# Patient Record
Sex: FEMALE | Race: BLACK OR AFRICAN AMERICAN | Employment: FULL TIME | URBAN - METROPOLITAN AREA
[De-identification: names, ages, dates, MRNs, and addresses within clinical notes are randomized per-mention and may not be internally consistent; named-entity substitution may affect disease eponyms.]

---

## 2019-12-21 ENCOUNTER — HOSPITAL ENCOUNTER (EMERGENCY)
Age: 29
Discharge: HOME | End: 2019-12-21
Attending: EMERGENCY MEDICINE
Payer: COMMERCIAL

## 2019-12-21 VITALS
OXYGEN SATURATION: 95 % | RESPIRATION RATE: 18 BRPM | DIASTOLIC BLOOD PRESSURE: 60 MMHG | HEIGHT: 64 IN | SYSTOLIC BLOOD PRESSURE: 124 MMHG | TEMPERATURE: 97.8 F | BODY MASS INDEX: 50.02 KG/M2 | WEIGHT: 293 LBS | HEART RATE: 76 BPM

## 2019-12-21 DIAGNOSIS — R53.81 MALAISE AND FATIGUE: ICD-10-CM

## 2019-12-21 DIAGNOSIS — R19.7 DIARRHEA, UNSPECIFIED TYPE: Primary | ICD-10-CM

## 2019-12-21 DIAGNOSIS — R53.83 MALAISE AND FATIGUE: ICD-10-CM

## 2019-12-21 LAB
ALBUMIN SERPL-MCNC: 3.7 G/DL (ref 3.5–5)
ALBUMIN/GLOB SERPL: 0.8 {RATIO} (ref 1.2–3.5)
ALP SERPL-CCNC: 80 U/L (ref 50–130)
ALT SERPL-CCNC: 24 U/L (ref 12–65)
ANION GAP SERPL CALC-SCNC: 6 MMOL/L (ref 7–16)
AST SERPL-CCNC: 17 U/L (ref 15–37)
BACTERIA URNS QL MICRO: 0 /HPF
BASOPHILS # BLD: 0 K/UL (ref 0–0.2)
BASOPHILS NFR BLD: 0 % (ref 0–2)
BILIRUB DIRECT SERPL-MCNC: 0.1 MG/DL
BILIRUB SERPL-MCNC: 0.2 MG/DL (ref 0.2–1.1)
BUN SERPL-MCNC: 12 MG/DL (ref 6–23)
CALCIUM SERPL-MCNC: 9.6 MG/DL (ref 8.3–10.4)
CASTS URNS QL MICRO: NORMAL /LPF
CHLORIDE SERPL-SCNC: 104 MMOL/L (ref 98–107)
CO2 SERPL-SCNC: 29 MMOL/L (ref 21–32)
CREAT SERPL-MCNC: 0.73 MG/DL (ref 0.6–1)
DIFFERENTIAL METHOD BLD: NORMAL
EOSINOPHIL # BLD: 0.2 K/UL (ref 0–0.8)
EOSINOPHIL NFR BLD: 2 % (ref 0.5–7.8)
EPI CELLS #/AREA URNS HPF: NORMAL /HPF
ERYTHROCYTE [DISTWIDTH] IN BLOOD BY AUTOMATED COUNT: 13.4 % (ref 11.9–14.6)
GLOBULIN SER CALC-MCNC: 4.4 G/DL (ref 2.3–3.5)
GLUCOSE SERPL-MCNC: 104 MG/DL (ref 65–100)
HCG UR QL: NEGATIVE
HCT VFR BLD AUTO: 39.4 % (ref 35.8–46.3)
HGB BLD-MCNC: 12.8 G/DL (ref 11.7–15.4)
IMM GRANULOCYTES # BLD AUTO: 0.1 K/UL (ref 0–0.5)
IMM GRANULOCYTES NFR BLD AUTO: 1 % (ref 0–5)
LYMPHOCYTES # BLD: 2.8 K/UL (ref 0.5–4.6)
LYMPHOCYTES NFR BLD: 34 % (ref 13–44)
MCH RBC QN AUTO: 27.6 PG (ref 26.1–32.9)
MCHC RBC AUTO-ENTMCNC: 32.5 G/DL (ref 31.4–35)
MCV RBC AUTO: 84.9 FL (ref 79.6–97.8)
MONOCYTES # BLD: 0.7 K/UL (ref 0.1–1.3)
MONOCYTES NFR BLD: 8 % (ref 4–12)
NEUTS SEG # BLD: 4.5 K/UL (ref 1.7–8.2)
NEUTS SEG NFR BLD: 55 % (ref 43–78)
NRBC # BLD: 0 K/UL (ref 0–0.2)
PLATELET # BLD AUTO: 303 K/UL (ref 150–450)
PMV BLD AUTO: 10 FL (ref 9.4–12.3)
POTASSIUM SERPL-SCNC: 3.8 MMOL/L (ref 3.5–5.1)
PROT SERPL-MCNC: 8.1 G/DL (ref 6.3–8.2)
RBC # BLD AUTO: 4.64 M/UL (ref 4.05–5.2)
RBC #/AREA URNS HPF: NORMAL /HPF
SODIUM SERPL-SCNC: 139 MMOL/L (ref 136–145)
TSH SERPL DL<=0.005 MIU/L-ACNC: 2.95 UIU/ML
WBC # BLD AUTO: 8.2 K/UL (ref 4.3–11.1)
WBC URNS QL MICRO: NORMAL /HPF

## 2019-12-21 PROCEDURE — 81025 URINE PREGNANCY TEST: CPT

## 2019-12-21 PROCEDURE — 80076 HEPATIC FUNCTION PANEL: CPT

## 2019-12-21 PROCEDURE — 85025 COMPLETE CBC W/AUTO DIFF WBC: CPT

## 2019-12-21 PROCEDURE — 80048 BASIC METABOLIC PNL TOTAL CA: CPT

## 2019-12-21 PROCEDURE — 81015 MICROSCOPIC EXAM OF URINE: CPT

## 2019-12-21 PROCEDURE — 99284 EMERGENCY DEPT VISIT MOD MDM: CPT | Performed by: EMERGENCY MEDICINE

## 2019-12-21 PROCEDURE — 81003 URINALYSIS AUTO W/O SCOPE: CPT | Performed by: EMERGENCY MEDICINE

## 2019-12-21 PROCEDURE — 84443 ASSAY THYROID STIM HORMONE: CPT

## 2019-12-21 RX ORDER — SODIUM CHLORIDE 0.9 % (FLUSH) 0.9 %
5-40 SYRINGE (ML) INJECTION EVERY 8 HOURS
Status: DISCONTINUED | OUTPATIENT
Start: 2019-12-21 | End: 2019-12-22 | Stop reason: HOSPADM

## 2019-12-21 RX ORDER — SODIUM CHLORIDE 0.9 % (FLUSH) 0.9 %
5-40 SYRINGE (ML) INJECTION AS NEEDED
Status: DISCONTINUED | OUTPATIENT
Start: 2019-12-21 | End: 2019-12-22 | Stop reason: HOSPADM

## 2019-12-22 NOTE — DISCHARGE INSTRUCTIONS
Patient Education        Diarrhea: Care Instructions  Your Care Instructions    Diarrhea is loose, watery stools (bowel movements). The exact cause is often hard to find. Sometimes diarrhea is your body's way of getting rid of what caused an upset stomach. Viruses, food poisoning, and many medicines can cause diarrhea. Some people get diarrhea in response to emotional stress, anxiety, or certain foods. Almost everyone has diarrhea now and then. It usually isn't serious, and your stools will return to normal soon. The important thing to do is replace the fluids you have lost, so you can prevent dehydration. The doctor has checked you carefully, but problems can develop later. If you notice any problems or new symptoms, get medical treatment right away. Follow-up care is a key part of your treatment and safety. Be sure to make and go to all appointments, and call your doctor if you are having problems. It's also a good idea to know your test results and keep a list of the medicines you take. How can you care for yourself at home? · Watch for signs of dehydration, which means your body has lost too much water. Dehydration is a serious condition and should be treated right away. Signs of dehydration are:  ? Increasing thirst and dry eyes and mouth. ? Feeling faint or lightheaded. ? A smaller amount of urine than normal.  · To prevent dehydration, drink plenty of fluids. Choose water and other caffeine-free clear liquids until you feel better. If you have kidney, heart, or liver disease and have to limit fluids, talk with your doctor before you increase the amount of fluids you drink. · Begin eating small amounts of mild foods the next day, if you feel like it. ? Try yogurt that has live cultures of Lactobacillus. (Check the label.)  ? Avoid spicy foods, fruits, alcohol, and caffeine until 48 hours after all symptoms are gone. ? Avoid chewing gum that contains sorbitol. ?  Avoid dairy products (except for yogurt with Lactobacillus) while you have diarrhea and for 3 days after symptoms are gone. · The doctor may recommend that you take over-the-counter medicine, such as loperamide (Imodium), if you still have diarrhea after 6 hours. Read and follow all instructions on the label. Do not use this medicine if you have bloody diarrhea, a high fever, or other signs of serious illness. Call your doctor if you think you are having a problem with your medicine. When should you call for help? Call 911 anytime you think you may need emergency care. For example, call if:    · You passed out (lost consciousness).     · Your stools are maroon or very bloody.    Call your doctor now or seek immediate medical care if:    · You are dizzy or lightheaded, or you feel like you may faint.     · Your stools are black and look like tar, or they have streaks of blood.     · You have new or worse belly pain.     · You have symptoms of dehydration, such as:  ? Dry eyes and a dry mouth. ? Passing only a little dark urine. ? Feeling thirstier than usual.     · You have a new or higher fever.    Watch closely for changes in your health, and be sure to contact your doctor if:    · Your diarrhea is getting worse.     · You see pus in the diarrhea.     · You are not getting better after 2 days (48 hours). Where can you learn more? Go to http://marita-rosalva.info/. Enter J827 in the search box to learn more about \"Diarrhea: Care Instructions. \"  Current as of: June 26, 2019  Content Version: 12.2  © 1656-2065 Healthwise, Incorporated. Care instructions adapted under license by [employer] (which disclaims liability or warranty for this information). If you have questions about a medical condition or this instruction, always ask your healthcare professional. [place] [address] any warranty or liability for your use of this information.          Patient Education        Fatigue: Care Instructions  Your Care Instructions    Fatigue is a feeling of tiredness, exhaustion, or lack of energy. You may feel fatigue because of too much or not enough activity. It can also come from stress, lack of sleep, boredom, and poor diet. Many medical problems, such as viral infections, can cause fatigue. Emotional problems, especially depression, are often the cause of fatigue. Fatigue is most often a symptom of another problem. Treatment for fatigue depends on the cause. For example, if you have fatigue because you have a certain health problem, treating this problem also treats your fatigue. If depression or anxiety is the cause, treatment may help. Follow-up care is a key part of your treatment and safety. Be sure to make and go to all appointments, and call your doctor if you are having problems. It's also a good idea to know your test results and keep a list of the medicines you take. How can you care for yourself at home? · Get regular exercise. But don't overdo it. Go back and forth between rest and exercise. · Get plenty of rest.  · Eat a healthy diet. Do not skip meals, especially breakfast.  · Reduce your use of caffeine, tobacco, and alcohol. Caffeine is most often found in coffee, tea, cola drinks, and chocolate. · Limit medicines that can cause fatigue. This includes tranquilizers and cold and allergy medicines. When should you call for help? Watch closely for changes in your health, and be sure to contact your doctor if:    · You have new symptoms such as fever or a rash.     · Your fatigue gets worse.     · You have been feeling down, depressed, or hopeless. Or you may have lost interest in things that you usually enjoy.     · You are not getting better as expected. Where can you learn more? Go to http://marita-rosalva.info/. Enter T762 in the search box to learn more about \"Fatigue: Care Instructions. \"  Current as of: June 26, 2019  Content Version: 12.2  © 4471-8380 [employer]. Care instructions adapted under license by [employer] (which disclaims liability or warranty for this information). If you have questions about a medical condition or this instruction, always ask your healthcare professional. [address] any warranty or liability for your use of this information.

## 2019-12-22 NOTE — ED TRIAGE NOTES
Pt has multiple complaints that range back a year. Nothing is actually wrong today, but she has ongoing chronic migraines, sometimes has abdominal pain, etc.  Does not have a doctor and does not know were to go for help.

## 2019-12-22 NOTE — ED NOTES
I have reviewed discharge instructions with the patient. The patient verbalized understanding. Patient left ED via Discharge Method: ambulatory to Home. Opportunity for questions and clarification provided. Patient given 0 scripts. To continue your aftercare when you leave the hospital, you may receive an automated call from our care team to check in on how you are doing. This is a free service and part of our promise to provide the best care and service to meet your aftercare needs.  If you have questions, or wish to unsubscribe from this service please call [phone]. Thank you for Choosing our [facility] Emergency Department.

## 2019-12-22 NOTE — ED PROVIDER NOTES
68-year-old -American female presents with multiple vague complaints. She reports that approximately 4 months ago she had a 2 to month period of daily diarrhea. She reports she saw mucus in the diarrhea but no blood. She did take probiotics which seemed to help the diarrhea but has not completely resolved. She reports over the past week she has felt generally ill with \"brain fog\" and today developed a diffuse itching rash. No fever. No vomiting. No abdominal pain. The history is provided by the patient. Well Woman   Primary symptoms include no dysuria. Associated symptoms include diarrhea. Pertinent negatives include no abdominal pain, no nausea, no vomiting, no dizziness and no fever. Past Medical History:   Diagnosis Date    Ill-defined condition     trigeminal neuropathy    Neurological disorder     chronic migraines. Past Surgical History:   Procedure Laterality Date    HX OTHER SURGICAL      tonsils and adnoids         History reviewed. No pertinent family history.     Social History     Socioeconomic History    Marital status: SINGLE     Spouse name: Not on file    Number of children: Not on file    Years of education: Not on file    Highest education level: Not on file   Occupational History    Not on file   Social Needs    Financial resource strain: Not on file    Food insecurity:     Worry: Not on file     Inability: Not on file    Transportation needs:     Medical: Not on file     Non-medical: Not on file   Tobacco Use    Smoking status: Never Smoker    Smokeless tobacco: Never Used   Substance and Sexual Activity    Alcohol use: Not Currently    Drug use: Never    Sexual activity: Not Currently   Lifestyle    Physical activity:     Days per week: Not on file     Minutes per session: Not on file    Stress: Not on file   Relationships    Social connections:     Talks on phone: Not on file     Gets together: Not on file     Attends Evangelical service: Not on file     Active member of club or organization: Not on file     Attends meetings of clubs or organizations: Not on file     Relationship status: Not on file    Intimate partner violence:     Fear of current or ex partner: Not on file     Emotionally abused: Not on file     Physically abused: Not on file     Forced sexual activity: Not on file   Other Topics Concern    Not on file   Social History Narrative    Not on file         ALLERGIES: Patient has no known allergies. Review of Systems   Constitutional: Negative for fever. HENT: Negative for congestion. Respiratory: Negative for cough and shortness of breath. Cardiovascular: Negative for chest pain. Gastrointestinal: Positive for diarrhea. Negative for abdominal pain, nausea and vomiting. Genitourinary: Negative for dysuria. Musculoskeletal: Negative for back pain and neck pain. Skin: Negative for rash. Neurological: Negative for dizziness. Psychiatric/Behavioral: Positive for confusion. Vitals:    12/21/19 2025   BP: 137/85   Pulse: 93   Resp: 18   Temp: 98.3 °F (36.8 °C)   SpO2: 97%   Weight: 153.8 kg (339 lb)   Height: 5' 4\" (1.626 m)            Physical Exam  Vitals signs and nursing note reviewed. Constitutional:       General: She is not in acute distress. Appearance: Normal appearance. HENT:      Head: Normocephalic and atraumatic. Mouth/Throat:      Mouth: Mucous membranes are moist.      Pharynx: Oropharynx is clear. Eyes:      Conjunctiva/sclera: Conjunctivae normal.      Pupils: Pupils are equal, round, and reactive to light. Neck:      Musculoskeletal: Normal range of motion and neck supple. Cardiovascular:      Rate and Rhythm: Normal rate and regular rhythm. Pulmonary:      Effort: Pulmonary effort is normal.      Breath sounds: Normal breath sounds. Abdominal:      General: There is no distension. Tenderness: There is no tenderness. Musculoskeletal: Normal range of motion. General: No swelling. Skin:     General: Skin is warm and dry. Neurological:      General: No focal deficit present. Mental Status: She is alert and oriented to person, place, and time. Cranial Nerves: No cranial nerve deficit. Sensory: No sensory deficit. Coordination: Coordination normal.      Deep Tendon Reflexes: Reflexes normal.      Comments: Normal gait   Psychiatric:         Mood and Affect: Mood normal.         Behavior: Behavior normal.          MDM  Number of Diagnoses or Management Options  Diagnosis management comments: Blood work and urine all unremarkable. Patient has normal neuro exam.  Reassuring lab work and vital signs. She currently has normal neuro exam etiology for symptoms not clear but at this time she appears safe for follow-up with primary care.        Amount and/or Complexity of Data Reviewed  Clinical lab tests: ordered and reviewed    Risk of Complications, Morbidity, and/or Mortality  Presenting problems: moderate  Diagnostic procedures: low  Management options: low           Procedures

## 2022-03-20 ENCOUNTER — HOSPITAL ENCOUNTER (EMERGENCY)
Age: 32
Discharge: HOME | End: 2022-03-20
Attending: OBSTETRICS & GYNECOLOGY | Admitting: OBSTETRICS & GYNECOLOGY
Payer: COMMERCIAL

## 2022-03-20 VITALS
SYSTOLIC BLOOD PRESSURE: 110 MMHG | DIASTOLIC BLOOD PRESSURE: 78 MMHG | TEMPERATURE: 97.7 F | RESPIRATION RATE: 18 BRPM | HEART RATE: 81 BPM | HEIGHT: 64 IN | BODY MASS INDEX: 44.22 KG/M2 | WEIGHT: 259 LBS

## 2022-03-20 LAB
A1 MICROGLOB PLACENTAL VAG QL: NEGATIVE
BACTERIA SPEC CULT: NORMAL
CONTROL LINE PRESENT?: NORMAL
EXPIRATION DATE: NORMAL
INTERNAL NEGATIVE CONTROL: NORMAL
KIT LOT NO.: NORMAL
SERVICE CMNT-IMP: NORMAL

## 2022-03-20 PROCEDURE — 59025 FETAL NON-STRESS TEST: CPT

## 2022-03-20 PROCEDURE — 76815 OB US LIMITED FETUS(S): CPT

## 2022-03-20 PROCEDURE — 99283 EMERGENCY DEPT VISIT LOW MDM: CPT | Performed by: OBSTETRICS & GYNECOLOGY

## 2022-03-20 PROCEDURE — 84112 EVAL AMNIOTIC FLUID PROTEIN: CPT

## 2022-03-20 PROCEDURE — 87653 STREP B DNA AMP PROBE: CPT

## 2022-03-20 PROCEDURE — 87081 CULTURE SCREEN ONLY: CPT

## 2022-03-20 PROCEDURE — 84112 EVAL AMNIOTIC FLUID PROTEIN: CPT | Performed by: OBSTETRICS & GYNECOLOGY

## 2022-03-20 RX ORDER — LANOLIN ALCOHOL/MO/W.PET/CERES
50 CREAM (GRAM) TOPICAL DAILY
COMMUNITY

## 2022-03-20 RX ORDER — FOLIC ACID 1 MG/1
1 TABLET ORAL DAILY
COMMUNITY

## 2022-03-20 NOTE — PROGRESS NOTES
Pt came in to labor and delivery OB triage with complaints of lower abdominal pain, possibly contractions, since late last night. States she had a trickle of fluid on Thursday but has since stopped and denies vaginal bleeding. Pt states positive fetal movement and that the pain has slowed down since she got to the hospital. Dr. Sanchez Herrera did bedside ultrasound, GBS swab, reviewed NST, and encouraged pt to follow up with her OB. Amnisure negative. Reviewed discharge instructions including kick counts and labor precautions and pt verbalizes understanding. Plan to d/c home.

## 2022-03-20 NOTE — DISCHARGE INSTRUCTIONS
Patient Education   Patient Education        Weeks 32 to 29 of Your Pregnancy: Care Instructions  Overview     During the last few weeks of your pregnancy, you may have more aches and pains. It's important to rest when you can. Your growing baby is putting more pressure on your bladder. So you may need to urinate more often. Hemorrhoids are also common. These are painful, itchy veins in the rectal area. You may want to talk with your doctor about banking your baby's umbilical cord blood. This is the blood left in the cord after birth. If you want to save this blood, you must arrange it ahead of time. You can't decide at the last minute. If you haven't already had the Tdap shot during this pregnancy, talk to your doctor about getting it. It will help protect your  against pertussis infection. Follow-up care is a key part of your treatment and safety. Be sure to make and go to all appointments, and call your doctor if you are having problems. It's also a good idea to know your test results and keep a list of the medicines you take. How can you care for yourself at home? Ease hemorrhoids  · Get more liquids, fruits, vegetables, and fiber in your diet. This will help keep your stools soft. · Avoid sitting for too long. Lie on your left side several times a day. · Clean yourself with soft, moist toilet paper. Or you can use witch hazel pads or personal hygiene pads. · If you are uncomfortable, try ice packs. Or you can sit in a warm sitz bath. Do these for 20 minutes at a time, as needed. · Use hydrocortisone cream for pain and itching. Two examples are Anusol and Preparation H Hydrocortisone. · Ask your doctor about taking an over-the-counter stool softener. Consider breastfeeding  · Experts recommend breastfeeding for 1 year or longer. · Breast milk may help protect your child from some health problems.   babies are less likely than formula-fed babies to:  ? Get ear infections, colds, diarrhea, and pneumonia. ? Be obese or get diabetes later in life. · Breastfeeding causes the release of a hormone called oxytocin. This hormone may help your uterus shrink back faster. · Breastfeeding may help you lose weight faster. Making milk burns calories. · Breastfeeding can lower your risk of breast cancer, ovarian cancer, and osteoporosis. Decide about circumcision for your baby  · As you make this decision, it may help to think about your personal, Confucianist, and family traditions. You get to decide if you will keep your baby's penis natural or if your baby will be circumcised. · If you decide that you would like to have your baby circumcised, talk with your doctor. You can share your concerns about pain. And you can discuss your preferences for anesthesia. Where can you learn more? Go to http://www.alston.com/  Enter X711 in the search box to learn more about \"Weeks 32 to 34 of Your Pregnancy: Care Instructions. \"  Current as of: June 16, 2021               Content Version: 13.2  © 2006-2022 [employer]. Care instructions adapted under license by [employer] (which disclaims liability or warranty for this information). If you have questions about a medical condition or this instruction, always ask your healthcare professional. [place] [address] any warranty or liability for your use of this information. Counting Your Baby's Kicks: Care Instructions  Overview     Counting your baby's kicks is one way your doctor can tell that your baby is healthy. Most women--especially in a first pregnancy--feel their baby move for the first time between 16 and 22 weeks. The movement may feel like flutters rather than kicks. Your baby may move more at certain times of the day. When you are active, you may notice less kicking than when you are resting. At your prenatal visits, your doctor will ask whether the baby is active.   In your last trimester, your doctor may ask you to count the number of times you feel your baby move. Follow-up care is a key part of your treatment and safety. Be sure to make and go to all appointments, and call your doctor if you are having problems. It's also a good idea to know your test results and keep a list of the medicines you take. How do you count fetal kicks? · A common method of checking your baby's movement is to note the length of time it takes to count ten movements (such as kicks, flutters, or rolls). · Pick your baby's most active time of day to count. This may be any time from morning to evening. · If you don't feel 10 movements in an hour, have something to eat or drink and count for another hour. If you don't feel at least 10 movements in the 2-hour period, call your doctor. When should you call for help? Call your doctor now or seek immediate medical care if:    · You noticed that your baby has stopped moving or is moving much less than normal.   Watch closely for changes in your health, and be sure to contact your doctor if you have any problems. Where can you learn more? Go to http://www.gray.com/  Enter [id number] in the search box to learn more about \"Counting Your Baby's Kicks: Care Instructions. \"  Current as of: June 16, 2021               Content Version: 13.2  © 1242-4656 Healthwise, [employer]. Care instructions adapted under license by [employer] (which disclaims liability or warranty for this information). If you have questions about a medical condition or this instruction, always ask your healthcare professional. [place] [address] any warranty or liability for your use of this information.

## 2022-03-20 NOTE — H&P
Subjective:     Kiersten Roche, MRN: [id number], is a 33 y.o.  female presents with 33 week IUP here for c/o of uterine contractions and abdominal pain. ? Leaking fluid; no bleeding/no foul odor. Reports GFM, taking vitamins. Has been seen for this pregnancy by OB office at [facility] then [facility], but not since 25 weeks ( no visits X 8 weeks). Had an U/S at [facility] S=D at 25 weeks. Has not had glucola. Danelle [place] admits to having COVID 4 times in total, and also this pregnancy. Unable to maintain OB relationship d/t finances. There are no problems to display for this patient. Past Medical History:   Diagnosis Date    Ill-defined condition     trigeminal neuropathy    Neurological disorder     chronic migraines. Past Surgical History:   Procedure Laterality Date    HX OTHER SURGICAL      tonsils and adnoids    GA GASTRIC BYPASS,OBESE<150CM SAFIA-EN-Y        [unfilled]  No Known Allergies   Social History     Tobacco Use    Smoking status: Never Smoker    Smokeless tobacco: Never Used   Substance Use Topics    Alcohol use: Not Currently      History reviewed. No pertinent family history. Prenatal Labs: No results found for: RUBELLAEXT, GRBSEXT, HBSAGEXT, HIVEXT, RPREXT, GONNOEXT, CHLAMEXT     Review of Systems  Constitutional: negative  Respiratory: negative  Cardiovascular: negative  Musculoskeletal:negative    Objective:     Patient Vitals for the past 8 hrs:   BP Temp Pulse Resp Height Weight   03/20/22 1635     5' 4\" (1.626 m) 259 lb (117.5 kg)   03/20/22 1630 110/78 97.7 °F (36.5 °C) 81 18       No intake or output data in the 24 hours ending 03/20/22 1703  Visit Vitals  /78   Pulse 81   Temp 97.7 °F (36.5 °C)   Resp 18   Ht 5' 4\" (1.626 m)   Wt 259 lb (117.5 kg)   BMI 44.46 kg/m²     General appearance: alert, cooperative, no distress, appears stated age  Head: Normocephalic, without obvious abnormality, atraumatic  Back: symmetric, no curvature.  ROM normal. No CVA tenderness. Lungs: clear to auscultation bilaterally  Heart: regular rate and rhythm, S1, S2 normal, no murmur, click, rub or gallop  Abdomen:  gravid at term  Pelvic: External genitalia normal, Vagina normal without discharge, exam obscured by obesity, cervix totally closed. No amniotic fluid seen, test for [facility] sent  Extremities: extremities normal, atraumatic, no cyanosis or edema  Pulses: 2+ and symmetric  Skin: Skin color, texture, turgor normal. No rashes or lesions    Bedside U/S- cephalic, GFM. JERRI subjectively wnl. Placenta grade 2  Assessment:     Active Problems:    * No active hospital problems. *      All questions answered    Beta done today. Plan:     Did GBS today. Amnisure is negative. She is informed she needs to get in to be soon ASAP for glucola and follow up U/S d/t COVID this pregnancy. For today, bedrest and fluids.  Cont PNVs.

## 2022-03-24 LAB
BACTERIA SPEC CULT: NORMAL
SERVICE CMNT-IMP: NORMAL